# Patient Record
Sex: FEMALE | Race: BLACK OR AFRICAN AMERICAN | NOT HISPANIC OR LATINO | Employment: FULL TIME | URBAN - METROPOLITAN AREA
[De-identification: names, ages, dates, MRNs, and addresses within clinical notes are randomized per-mention and may not be internally consistent; named-entity substitution may affect disease eponyms.]

---

## 2017-09-27 ENCOUNTER — HOSPITAL ENCOUNTER (EMERGENCY)
Facility: OTHER | Age: 32
Discharge: HOME | End: 2017-09-28
Attending: EMERGENCY MEDICINE
Payer: MEDICAID

## 2017-09-27 DIAGNOSIS — F17.290 HOOKAH PIPE SMOKER: ICD-10-CM

## 2017-09-27 DIAGNOSIS — R42 SPELL OF DIZZINESS: Primary | ICD-10-CM

## 2017-09-27 PROCEDURE — 99283 EMERGENCY DEPT VISIT LOW MDM: CPT

## 2017-09-28 VITALS
DIASTOLIC BLOOD PRESSURE: 96 MMHG | BODY MASS INDEX: 34.99 KG/M2 | TEMPERATURE: 98 F | SYSTOLIC BLOOD PRESSURE: 156 MMHG | WEIGHT: 210 LBS | HEIGHT: 65 IN | RESPIRATION RATE: 20 BRPM | OXYGEN SATURATION: 99 % | HEART RATE: 84 BPM

## 2017-09-28 NOTE — ED PROVIDER NOTES
"Encounter Date: 9/27/2017    SCRIBE #1 NOTE: I, Phuong Floyd, am scribing for, and in the presence of,  Dr. Mendoza. I have scribed the entire note.       History     Chief Complaint   Patient presents with    Dizziness     x 1 hr after smoking hookah at a hookah bar; Per EMS pt hypertensive at 193/112; denies hx of HTN;      Time seen by provider: 11:40 PM    This is a 32 y.o. female who presents with complaint of dizziness x 1 hour. She also has a dry throat, stating it feels like "something's stuck." Pt had onset of symptoms after smoking hookah. She states that she regularly smokes hookah and has never has similar symptoms. Her dizziness has resolved. She denies any HA, weakness, CP, palpitations, rhinorrhea, congestion, sore throat, fever, chills, cough, and SOB. Pt denies any hx of HTN.      The history is provided by the patient.     Review of patient's allergies indicates:  Allergies not on file  No past medical history on file.  No past surgical history on file.  No family history on file.  Social History   Substance Use Topics    Smoking status: Not on file    Smokeless tobacco: Not on file    Alcohol use Not on file     Review of Systems   Constitutional: Negative for chills and fever.   HENT: Negative for congestion, rhinorrhea and sore throat.         Dry throat   Respiratory: Negative for cough and shortness of breath.    Cardiovascular: Negative for chest pain and palpitations.   Gastrointestinal: Negative for abdominal pain, diarrhea, nausea and vomiting.   Endocrine: Negative for polyuria.   Genitourinary: Negative for decreased urine volume and dysuria.   Musculoskeletal: Negative for back pain and neck pain.   Skin: Negative for rash.   Allergic/Immunologic: Negative for immunocompromised state.   Neurological: Positive for dizziness (now resolved). Negative for syncope, weakness, light-headedness, numbness and headaches.   Hematological: Does not bruise/bleed easily. "   Psychiatric/Behavioral: Negative for behavioral problems and confusion.       Physical Exam     Initial Vitals [09/27/17 2333]   BP Pulse Resp Temp SpO2   (!) 182/83 100 20 98.3 °F (36.8 °C) --      MAP       116         Physical Exam    Nursing note and vitals reviewed.  Constitutional: She appears well-developed and well-nourished. She is not diaphoretic. No distress.   HENT:   Head: Normocephalic and atraumatic.   Right Ear: External ear normal.   Left Ear: External ear normal.   Eyes: Right eye exhibits no discharge. Left eye exhibits no discharge.   Neck: Normal range of motion. Neck supple.   Cardiovascular: Normal rate, regular rhythm and normal heart sounds. Exam reveals no gallop and no friction rub.    No murmur heard.  Pulmonary/Chest: Breath sounds normal. No respiratory distress. She has no wheezes. She has no rhonchi. She has no rales.   Abdominal: Soft. Bowel sounds are normal. She exhibits no distension. There is no tenderness. There is no rebound and no guarding.   Musculoskeletal: Normal range of motion. She exhibits no edema or tenderness.   Lymphadenopathy:     She has no cervical adenopathy.   Neurological: She is alert and oriented to person, place, and time. She has normal strength. No sensory deficit.   Skin: Skin is warm and dry. No rash noted. No erythema.   Psychiatric: She has a normal mood and affect. Her behavior is normal.         ED Course   Procedures  Labs Reviewed - No data to display             Additional MDM:   Comments: 32-year-old female presents with complaint of dizziness while smoking Hookah.  Dizziness had resolved by time of my assessment.  She denied any other associated symptoms.  Patient was observed on a cardiac monitor for over 30 minutes without any events and without any recurrent symptoms.  Patient discharged home in stable condition..          Scribe Attestation:   Scribe #1: I performed the above scribed service and the documentation accurately describes the  services I performed. I attest to the accuracy of the note.    Attending Attestation:           Physician Attestation for Scribe:  Physician Attestation Statement for Scribe #1: I, Dr. Mendoza, reviewed documentation, as scribed by Phuong Floyd in my presence, and it is both accurate and complete.                 ED Course      Clinical Impression:   No diagnosis found.                             Nina Mendoza MD  09/28/17 0109

## 2017-09-28 NOTE — ED NOTES
LOC: The patient is awake, alert and aware of environment with an appropriate affect, the patient is oriented x 4 with appropriate speech.  APPEARANCE: Patient resting comfortably and in no acute distress, clean and well groomed.  SKIN: The skin is warm and dry, patient has normal skin turgor, skin is intact, no breakdown or brusing noted.  MUSKULOSKELETAL: Patient observed ambulating with a steady gait. Patient moving all extremities well, no obvious deformities noted.  RESPIRATORY: Airway is open and patent, respirations are spontaneous, patient has a normal effort and rate. Breath sounds are clear and equal bilaterally.  CARDIAC: Heart rate and rhythm normal, capillary refill < 3 seconds, no edema noted.  ABDOMEN: Soft and non tender to palpation, no distention noted.

## 2017-09-28 NOTE — ED TRIAGE NOTES
Pt presents to the ED via [facility] with c/o dizziness x 1 hr PTA. Pt was smoking hookah at a hookah bar when she began feeling dizzy and her throat became dry. Per EMS pt was hypertensive on arrival. Pt denies hx of HTN. Denies presence of dizziness on arrival.

## 2017-10-12 ENCOUNTER — HOSPITAL ENCOUNTER (EMERGENCY)
Facility: HOSPITAL | Age: 32
Discharge: HOME | End: 2017-10-12
Payer: MEDICAID

## 2017-10-12 VITALS
TEMPERATURE: 98 F | DIASTOLIC BLOOD PRESSURE: 92 MMHG | SYSTOLIC BLOOD PRESSURE: 186 MMHG | RESPIRATION RATE: 20 BRPM | OXYGEN SATURATION: 100 % | HEART RATE: 93 BPM | WEIGHT: 200 LBS | HEIGHT: 65 IN | BODY MASS INDEX: 33.32 KG/M2

## 2017-10-12 DIAGNOSIS — F41.9 ANXIETY DISORDER, UNSPECIFIED TYPE: Primary | ICD-10-CM

## 2017-10-12 DIAGNOSIS — R42 DIZZINESS: Primary | ICD-10-CM

## 2017-10-12 PROCEDURE — 25000003 PHARM REV CODE 250: Performed by: EMERGENCY MEDICINE

## 2017-10-12 PROCEDURE — 93005 ELECTROCARDIOGRAM TRACING: CPT

## 2017-10-12 PROCEDURE — 99283 EMERGENCY DEPT VISIT LOW MDM: CPT

## 2017-10-12 RX ORDER — CLONAZEPAM 0.5 MG/1
0.5 TABLET ORAL 3 TIMES DAILY PRN
Qty: 15 TABLET | Refills: 0 | Status: SHIPPED | OUTPATIENT
Start: 2017-10-12 | End: 2018-10-12

## 2017-10-12 RX ORDER — AMITRIPTYLINE HYDROCHLORIDE 25 MG/1
25 TABLET, FILM COATED ORAL NIGHTLY PRN
Qty: 10 TABLET | Refills: 0 | Status: SHIPPED | OUTPATIENT
Start: 2017-10-12 | End: 2018-10-12

## 2017-10-12 RX ORDER — ALPRAZOLAM 1 MG/1
1 TABLET ORAL
Status: COMPLETED | OUTPATIENT
Start: 2017-10-12 | End: 2017-10-12

## 2017-10-12 RX ADMIN — ALPRAZOLAM 1 MG: 1 TABLET ORAL at 03:10

## 2017-10-12 NOTE — ED NOTES
Pt to ED with complaints of insomnia. Pt states since August, she has random episodes of dizziness in public. States when this happens, she also has an urge to urinate.

## 2017-10-12 NOTE — ED PROVIDER NOTES
Encounter Date: 10/12/2017    SCRIBE #1 NOTE: I, Malulouise Frazier, am scribing for, and in the presence of, Dr. Mclaughlin.       History     Chief Complaint   Patient presents with    Dizziness     pt. c/o dizziness, insomnia and waking up from sleep with palpitations for approx. 3 weeks. pt. was seen at [facility] on 9/27 for symptoms and was dx. with anxiety. pt. has f/u with [facility] in 5 days.      Time seen by provider: 3:13 AM    This is a 32 y.o. female with a PMHx of recently diagnosed anxiety who presents with complaint of insomnia and palpitations. The patient has had some insomnia since her father passed away last year. She now thinks she is having panic attacks for about the last 2 months which prevent her from sleeping. This has increased in frequency to be every night recently. She denies extra stress at work. She denies smoking hookah which may have contributed to her anxiety 2-3 weeks ago when she was evaluated at the [facility] ED. The patient also reports some intermittent lightheadedness over the last day. The patient denies tingling to face or hands. She has an appointment with [facility] to evaluate this further in the next week.       The history is provided by the patient.     Review of patient's allergies indicates:  No Known Allergies  History reviewed. No pertinent past medical history.  History reviewed. No pertinent surgical history.  History reviewed. No pertinent family history.  Social History   Substance Use Topics    Smoking status: Never Smoker    Smokeless tobacco: Never Used    Alcohol use Yes     Review of Systems   Constitutional: Negative for fever.   HENT: Negative for facial swelling.    Eyes: Negative for redness.   Respiratory: Negative for shortness of breath.    Cardiovascular: Positive for palpitations.   Gastrointestinal: Negative for abdominal distention.   Musculoskeletal: Negative for neck stiffness.   Skin: Negative for rash.   Neurological: Positive  for light-headedness. Negative for speech difficulty.   Psychiatric/Behavioral: Positive for sleep disturbance.       Physical Exam     Initial Vitals [10/12/17 0307]   BP Pulse Resp Temp SpO2   (!) 175/81 88 20 98.2 °F (36.8 °C) 97 %      MAP       112.33         Physical Exam    Nursing note and vitals reviewed.  Constitutional: She appears well-developed and well-nourished. She is not diaphoretic. No distress.   HENT:   Head: Normocephalic and atraumatic.   Eyes: Conjunctivae and EOM are normal. Pupils are equal, round, and reactive to light. No scleral icterus.   Neck: Normal range of motion. Neck supple. No thyromegaly present.   Cardiovascular: Regular rhythm and normal heart sounds. Tachycardia present.    No murmur heard.  Pulmonary/Chest: Breath sounds normal. No respiratory distress. She has no wheezes. She has no rhonchi. She has no rales.   Abdominal: Soft. Bowel sounds are normal. She exhibits no distension. There is no tenderness.   Musculoskeletal: Normal range of motion. She exhibits no edema or tenderness.   Neurological: She is alert and oriented to person, place, and time. She has normal strength. No cranial nerve deficit.   Skin: Skin is warm and dry. No rash noted. No erythema.         ED Course   Procedures  Labs Reviewed - No data to display          Medical Decision Making:   History:   Old Medical Records: I decided to obtain old medical records.  ED Management:  Ijeoma Asencio is a 32 y.o. female who presents with  palpitations with feelings of anxiety.  The symptoms are consistent with panic attack treated with amitriptyline 25 mg nightly for insomnia and depression and is given Klonopin for anxiety.  She is strongly encouraged to follow-up with primary care for further management of her anxiety disorder.  She has no evidence of cardiac pathology.  She is not [place] any evidence of psychosis.       APC / Resident Notes:   I, Dr. Xander Mclaughlin III, personally performed the services  described in this documentation. All medical record entries made by the scribe were at my direction and in my presence.  I have reviewed the chart and agree that the record reflects my personal performance and is accurate and complete. Xander Mclaughlin III, MD.  9:09 PM 10/17/2017              ED Course      Clinical Impression:   The encounter diagnosis was Anxiety disorder, unspecified type.    Disposition:   Disposition: Discharged  Condition: Stable                        Xander Mclaughlin III, MD  10/17/17 0635

## 2017-10-12 NOTE — ED NOTES
APPEARANCE: Alert, oriented and in no acute distress.  CARDIAC: Normal rate and rhythm. S1S2 noted. Pt denies chest pain. Denies diaphoresis, dyspnea, n/v. Pt states she only becomes dizzy when she is in public (for example, the store and school).   PERIPHERAL VASCULAR: peripheral pulses present. Normal cap refill. No edema. Warm to touch.  RESPIRATORY:Normal rate and effort, breath sounds clear bilaterally throughout chest. Respirations are equal and unlabored no obvious signs of distress.  GASTRO: soft, bowel sounds normal, no tenderness, no abdominal distention.  MUSC: Full ROM. No bony tenderness or soft tissue tenderness. No obvious deformity.  SKIN: Skin is warm and dry, normal skin turgor, mucous membranes moist.  NEURO: 5/5 strength major flexors/extensors bilaterally. Sensory intact to light touch bilaterally. [place] coma scale: eyes open spontaneously-4, oriented & converses-5, obeys commands-6. No neurological abnormalities.   MENTAL STATUS: awake, alert and aware of environment.  EYE: PERRL, both eyes: pupils brisk and reactive to light. Normal size.  ENT: EARS: no obvious drainage. NOSE: no active bleeding.